# Patient Record
(demographics unavailable — no encounter records)

---

## 2024-12-09 NOTE — PROCEDURE
[FreeTextEntry1] :  - After discussion of risks and benefits, the patient agreed to proceed with a cortisone injection. - Side: right - Finger: middle finger trigger finger - Medications: 0.5cc of 1% Lidocaine and 1cc of Celestone Solupan, 6mg/cc, using sterile technique. - Patient tolerated the procedure well without complications. - Patient was told that the symptoms may worsen for a day or two, and should then begin to improve. - Instructions: Patient was instructed on activity modification for the next several days. - Follow-up: Within 4 weeks to assess response to the injection.

## 2024-12-09 NOTE — ADDENDUM
[FreeTextEntry1] :  I, Rodri Tenorio, acted solely as a scribe for Dr. Dyson on this date on 12/09/2024.

## 2024-12-09 NOTE — END OF VISIT
[FreeTextEntry3] : This note was written by Rodri Tenorio on 12/09/2024 acting solely as a scribe for Dr. Deep yDson.   All medical record entries made by the Scribe were at my, Dr. Deep yDson, direction and personally dictated by me on 12/09/2024. I have personally reviewed the chart and agree that the record accurately reflects my personal performance of the history, physical exam, assessment and plan.

## 2024-12-09 NOTE — CONSULT LETTER
[Dear  ___] : Dear  [unfilled], [Consult Letter:] : I had the pleasure of evaluating your patient, [unfilled]. [Please see my note below.] : Please see my note below. [Consult Closing:] : Thank you very much for allowing me to participate in the care of this patient.  If you have any questions, please do not hesitate to contact me. [Sincerely,] : Sincerely, [FreeTextEntry3] : Deep Dyson M.D. Surgery of the Hand & Upper Extremity Orthopaedic Surgery Chief, Hand Service, [facility]  of Orthopedic Surgery, [employer] [facility]Email: [email or web address] Phone: [phone]

## 2024-12-09 NOTE — PHYSICAL EXAM
[de-identified] : - Constitutional: This is a male in no obvious distress.   - Psych: Patient is alert and oriented to person, place and time.  Patient has a normal mood and affect.  - Cardiovascular: Normal pulses throughout the upper extremities.  No significant varicosities are noted in the upper extremities.  - Neuro: Strength and sensation are intact throughout the upper extremities.  Patient has normal coordination.   ---   Examination of his right hand demonstrates swelling and tenderness along the A1 pulley of the middle finger.  There is triggering.  There is no trigger of the other digits.  He is neurovascularly intact distally.          [de-identified] : I reviewed radiographs of the right middle finger dated 12/03/2024 which demonstrated no obvious fractures or dislocations, minimal degenerative changes at the DIP and MCP joints.

## 2024-12-09 NOTE — HISTORY OF PRESENT ILLNESS
[Right] : right hand dominant [FreeTextEntry1] : See prior note. He was previously seen in my office regarding a left middle finger trigger finger which was treated with 2 cortisone injections.  He no longer has symptoms in this regard.  He comes in today for evaluation of right middle finger pain that began about two weeks ago with no known injury. He reports locking and stiffness. He rates his pain as a 4-5/10. He denies swelling or radiating pain. He has been taking indomethacin for his symptoms.  He is prediabetic.  He works as a .  He was referred by Dr. Blunt.

## 2025-01-03 NOTE — HISTORY OF PRESENT ILLNESS
[FreeTextEntry1] : 5 weeks status post right middle finger trigger cortisone injection #1.  I have seen him in the past regarding a left middle finger trigger finger that was previously given 2 cortisone injections.    He is He is prediabetic.  He works as a .

## 2025-01-03 NOTE — PHYSICAL EXAM
[de-identified] : - Constitutional: This is a male in no obvious distress.   - Psych: Patient is alert and oriented to person, place and time.  Patient has a normal mood and affect.  - Cardiovascular: Normal pulses throughout the upper extremities.  No significant varicosities are noted in the upper extremities.  - Neuro: Strength and sensation are intact throughout the upper extremities.  Patient has normal coordination.   ---   Examination of his right hand demonstrates swelling and tenderness along the A1 pulley of the middle finger.  There is triggering.  There is no trigger of the other digits.  He is neurovascularly intact distally.          [de-identified] : I reviewed radiographs of the right middle finger dated 12/03/2024 which demonstrated no obvious fractures or dislocations, minimal degenerative changes at the DIP and MCP joints.

## 2025-01-06 NOTE — PHYSICAL EXAM
[de-identified] : - Constitutional: This is a male in no obvious distress.   - Psych: Patient is alert and oriented to person, place and time.  Patient has a normal mood and affect.  - Cardiovascular: Normal pulses throughout the upper extremities.  No significant varicosities are noted in the upper extremities.  - Neuro: Strength and sensation are intact throughout the upper extremities.  Patient has normal coordination.   ---   Examination of his right hand demonstrates swelling and tenderness along the A1 pulley of the middle finger.  There is triggering.  There is no trigger of the other digits.  He is neurovascularly intact distally.          [de-identified] : I reviewed radiographs of the right middle finger dated 12/03/2024 which demonstrated no obvious fractures or dislocations, minimal degenerative changes at the DIP and MCP joints.

## 2025-05-22 NOTE — HEALTH RISK ASSESSMENT
[Good] : ~his/her~  mood as  good [Yes] : Yes [2 - 3 times a week (3 pts)] : 2 - 3  times a week (3 points) [1 or 2 (0 pts)] : 1 or 2 (0 points) [Never (0 pts)] : Never (0 points) [No] : In the past 12 months have you used drugs other than those required for medical reasons? No [No falls in past year] : Patient reported no falls in the past year [0] : 2) Feeling down, depressed, or hopeless: Not at all (0) [PHQ-2 Negative - No further assessment needed] : PHQ-2 Negative - No further assessment needed [Audit-CScore] : 3 [[facility]] : 6 [[id number]] : 0 [Former] : Former [20 or more] : 20 or more [< 15 Years] : < 15 Years [Change in mental status noted] : No change in mental status noted [Language] : denies difficulty with language [Behavior] : denies difficulty with behavior [Learning/Retaining New Information] : denies difficulty learning/retaining new information [Handling Complex Tasks] : denies difficulty handling complex tasks [Reasoning] : denies difficulty with reasoning [Spatial Ability and Orientation] : denies difficulty with spatial ability and orientation [With Family] : lives with family [Fully functional (bathing, dressing, toileting, transferring, walking, feeding)] : Fully functional (bathing, dressing, toileting, transferring, walking, feeding) [Fully functional (using the telephone, shopping, preparing meals, housekeeping, doing laundry, using] : Fully functional and needs no help or supervision to perform IADLs (using the telephone, shopping, preparing meals, housekeeping, doing laundry, using transportation, managing medications and managing finances) [Reports changes in hearing] : Reports no changes in hearing [Reports changes in vision] : Reports no changes in vision [Reports normal functional visual acuity (ie: able to read med bottle)] : Reports normal functional visual acuity [Smoke Detector] : smoke detector [Carbon Monoxide Detector] : carbon monoxide detector [Seat Belt] :  uses seat belt [With Patient/Caregiver] : , with patient/caregiver [Name: ___] : Health Care Proxy's Name: [unfilled]  [AdvancecareDate] : 05/25

## 2025-05-22 NOTE — ASSESSMENT
[FreeTextEntry1] : DM2: Check a1c, alb/cr. On metformin. Recommended he see ophtho.  HTN: BP controlled. On lisinopril, metoprolol, HCTz.  HLD: check lipids. On simvastatin.  HCM: Due for colonoscopy 2029. Declined shingrix. Check labs.  Adrenal nodule, thyroid nodule: Discussed US and CT ab for follow-up.    [Vaccines Reviewed] : Immunizations reviewed today. Please see immunization details in the vaccine log within the immunization flowsheet.

## 2025-07-01 NOTE — HISTORY OF PRESENT ILLNESS
[de-identified] : 69M, [place] descent, ex-smoker, PMHx HTN, DM2, HLD, BPH, osteoarthritis, PVCs, thyroid nodules, referred for hematologic consultation of thrombocytopenia. Patient is minimally symptomatic, complaining of fatigue; denies chest pain, palpitations, fever, weight loss, headache. Gives family history of CAD and HTN on both maternal and paternal sides. Endorses no pertinent family history of hematologic disorders. Medication list includes lisinopril, hydrochlorothiazide, metoprolol, simvastatin, insulin.  Hematologic profile shows a drop in platelet count at 68,000; blood work reviewed from 2020 showed normal platelet count until December 2023; lost to follow-up for more than a year and a half, he is returning with new thrombocytopenia; RBCs and WBCs in normal range.  Denies recent traveling, hospitalization close contact with sick family members.  He has never had bone marrow studies.  Denies bruising or easy bleeding.  Alcohol consumption is occasional, prefers beer. [FreeTextEntry1] : expectant surveillance

## 2025-07-01 NOTE — CONSULT LETTER
[Dear  ___] : Dear  [unfilled], [Consult Letter:] : I had the pleasure of evaluating your patient, [unfilled]. [Please see my note below.] : Please see my note below. [Consult Closing:] : Thank you very much for allowing me to participate in the care of this patient.  If you have any questions, please do not hesitate to contact me. [Sincerely,] : Sincerely, [FreeTextEntry2] : Patrick Blunt MD    [FreeTextEntry3] : JEREMY IRELAND M.D. Hematology/ Oncology [place] [address] Telephone: [phone] Fax: [phone]

## 2025-07-01 NOTE — ASSESSMENT
[FreeTextEntry1] : Mr. ARREDONDO 's questions were answered to his satisfaction. He expressed his understanding and willingness to comply with the above recommendations.